# Patient Record
(demographics unavailable — no encounter records)

---

## 2019-02-24 NOTE — RAD
FOUR VIEWS LEFT KNEE:

 

DATE: 2/24/2019.

 

COMPARISON: 

None.

 

HISTORY: 

Left knee pain for 1 week.  No history of injury.

 

FINDINGS: 

There is moderate medial compartment narrowing with osteophyte formation and subchondral sclerosis.  
There is mild lateral compartment narrowing with associated chondrocalcinosis and osteophyte formatio
n of the lateral femoral condyle.

 

There is moderate patellofemoral joint space narrowing.  There is no knee joint effusion, displaced f
racture, or evidence of dislocation seen.

 

IMPRESSION: 

Prominent multicompartment degenerative joint disease with no displaced fracture or dislocation.

 

POS: [facility]